# Patient Record
Sex: FEMALE | Race: WHITE | NOT HISPANIC OR LATINO | Employment: PART TIME | URBAN - METROPOLITAN AREA
[De-identification: names, ages, dates, MRNs, and addresses within clinical notes are randomized per-mention and may not be internally consistent; named-entity substitution may affect disease eponyms.]

---

## 2021-05-24 ENCOUNTER — OFFICE VISIT (OUTPATIENT)
Dept: URGENT CARE | Age: 71
End: 2021-05-24
Payer: COMMERCIAL

## 2021-05-24 VITALS
DIASTOLIC BLOOD PRESSURE: 73 MMHG | SYSTOLIC BLOOD PRESSURE: 156 MMHG | TEMPERATURE: 97.4 F | HEART RATE: 90 BPM | OXYGEN SATURATION: 97 % | RESPIRATION RATE: 16 BRPM

## 2021-05-24 DIAGNOSIS — H00.036 EYELID CELLULITIS, LEFT: Primary | ICD-10-CM

## 2021-05-24 PROCEDURE — G0382 LEV 3 HOSP TYPE B ED VISIT: HCPCS | Performed by: PHYSICIAN ASSISTANT

## 2021-05-24 RX ORDER — LISINOPRIL AND HYDROCHLOROTHIAZIDE 25; 20 MG/1; MG/1
1 TABLET ORAL DAILY
COMMUNITY

## 2021-05-24 RX ORDER — LEVOTHYROXINE SODIUM 0.12 MG/1
125 TABLET ORAL DAILY
COMMUNITY

## 2021-05-24 RX ORDER — CEPHALEXIN 500 MG/1
500 CAPSULE ORAL EVERY 6 HOURS SCHEDULED
Qty: 20 CAPSULE | Refills: 0 | Status: SHIPPED | OUTPATIENT
Start: 2021-05-24 | End: 2021-05-29

## 2021-05-25 NOTE — PROGRESS NOTES
[facility]        NAME: So Abdalla is a 69 y o  female  : 1950    MRN: [id number]  DATE: May 24, 2021  TIME: 10:35 PM    Assessment and Plan   Eyelid cellulitis, left [H00 [id number]]  1  Eyelid cellulitis, left  cephalexin (KEFLEX) 500 mg capsule         Patient Instructions       Follow up with PCP in 3-5 days  Proceed to  ER if symptoms worsen  Chief Complaint     Chief Complaint   Patient presents with    Facial Swelling     around left eye; thought it was from working outside yesterday; took benadryl and used hydrocortisone cream         History of Present Illness        Patient states she was outside yesterday working in the [place] and her left eye started getting itchy and swollen  She has been taking Benadryl but hydrocortisone cream on it  It is now red warm and swollen and painful  She denies any drainage  Review of Systems   Review of Systems   Constitutional: Negative  HENT: Negative  Eyes: Positive for pain, redness and itching  Negative for photophobia, discharge and visual disturbance  Respiratory: Negative  Cardiovascular: Negative  Gastrointestinal: Negative  Musculoskeletal: Negative  Neurological: Negative  Psychiatric/Behavioral: Negative            Current Medications       Current Outpatient Medications:     ATORVASTATIN CALCIUM PO, Take by mouth, Disp: , Rfl:     levothyroxine 125 mcg tablet, Take 125 mcg by mouth daily, Disp: , Rfl:     lisinopril-hydrochlorothiazide (PRINZIDE,ZESTORETIC) 20-25 MG per tablet, Take 1 tablet by mouth daily, Disp: , Rfl:     metFORMIN (GLUCOPHAGE) 1000 MG tablet, Take 1,000 mg by mouth 2 (two) times a day with meals, Disp: , Rfl:     cephalexin (KEFLEX) 500 mg capsule, Take 1 capsule (500 mg total) by mouth every 6 (six) hours for 5 days, Disp: 20 capsule, Rfl: 0    Current Allergies     Allergies as of 2021    (No Known Allergies)            The following portions of the patient's history were reviewed and updated as appropriate: allergies, current medications, past family history, past medical history, past social history, past surgical history and problem list      Past Medical History:   Diagnosis Date    Diabetes mellitus ([address] )     Hypertension     Hypothyroid        Past Surgical History:   Procedure Laterality Date    CHOLECYSTECTOMY         History reviewed  No pertinent family history  Medications have been verified  Objective   /73   Pulse 90   Temp (!) 97 4 °F (36 3 °C)   Resp 16   SpO2 97%        Physical Exam     Physical Exam  Vitals signs and nursing note reviewed  Constitutional:       General: She is not in acute distress  Appearance: Normal appearance  She is not ill-appearing, toxic-appearing or diaphoretic  HENT:      Head: Normocephalic and atraumatic  Eyes:      General:         Right eye: No discharge  Left eye: No discharge  Extraocular Movements: Extraocular movements intact  Pupils: Pupils are equal, round, and reactive to light  Comments:  Swelling and erythema surrounding eye on upper and lower eyelid  Cardiovascular:      Rate and Rhythm: Normal rate  Pulses: Normal pulses  Pulmonary:      Effort: Pulmonary effort is normal    Skin:     General: Skin is warm and dry  Capillary Refill: Capillary refill takes less than 2 seconds  Findings: Erythema present  Neurological:      General: No focal deficit present  Mental Status: She is alert and oriented to person, place, and time     Psychiatric:         Mood and Affect: Mood normal          Behavior: Behavior normal

## 2021-05-25 NOTE — PATIENT INSTRUCTIONS
Antibiotics as directed   Benadryl as needed  Follow-up with primary care doctor  ER if her symptoms worsen

## 2021-06-08 ENCOUNTER — OFFICE VISIT (OUTPATIENT)
Dept: OBGYN CLINIC | Facility: MEDICAL CENTER | Age: 71
End: 2021-06-08
Payer: COMMERCIAL

## 2021-06-08 VITALS
HEART RATE: 92 BPM | DIASTOLIC BLOOD PRESSURE: 79 MMHG | WEIGHT: 195 LBS | BODY MASS INDEX: 30.61 KG/M2 | HEIGHT: 67 IN | TEMPERATURE: 97.5 F | SYSTOLIC BLOOD PRESSURE: 135 MMHG

## 2021-06-08 DIAGNOSIS — S86.012A STRAIN OF LEFT ACHILLES TENDON, INITIAL ENCOUNTER: Primary | ICD-10-CM

## 2021-06-08 PROCEDURE — 99203 OFFICE O/P NEW LOW 30 MIN: CPT | Performed by: STUDENT IN AN ORGANIZED HEALTH CARE EDUCATION/TRAINING PROGRAM

## 2021-06-08 NOTE — PROGRESS NOTES
1  Strain of left Achilles tendon, initial encounter       No orders of the defined types were placed in this encounter  Imaging Studies (I personally reviewed images in PACS and report):    None ordered today    IMPRESSION:  Acute left distal Achilles pain with intact Achilles function on clinical exam today  Will treat as achilles tendinitis  -  Patient reports a precipitating injury but unspecified fall prior to her pain  Date of Injury:   06/07/2021  Follow up interval:  2 days    PLAN:  I have discussed with the patient the pathophysiology of this diagnosis and reviewed how the examination correlates with this diagnosis as per above  Treatment options were discussed at length and after discussing these treatment options, I recommended conservative treatments and given home exercises  I recommended she take NSAIDs/ acetaminophen as needed  I recommended icing 20 minutes on/ off as needed  I offered prescription naproxen NSAIDs, but patient prefers to take OTC NSAIDs at this time  I also recommended for her to purchase a heel lift/cup to reduce strain over her Achilles  I counseled this pain should improve over the next 2-6 weeks and if it does not she can follow up if needed  Patient prefers to follow-up as needed at this time    Return if symptoms worsen or fail to improve  CHIEF COMPLAINT:  Left posterior heel pain    HPI:  Marquita Alarcon is a 69 y o  female  who presents for       Visit 06/08/2021 :  Initial evaluation of posterior heel pain: Pt [place] airport -  She is unsure of mechanism of injury but felt sudden pain over the posterior aspect of her heel and denies any pain over her lateral/medial aspects of her ankle  Pain is located over the distal aspect of her Achilles tendon just proximal to it's insertion on the calcaneus  Pain described as sharp / aching / pulling, nonradiating   Pain is aggravated with prolonged walking especially with foot dorsiflexion  She has changed her gait in order to avoid by keeping her foot in dorsiflexion  Walking exacerbates the pain, rest relieves it  No pain at rest and only moderate pain when walking  Denies prior injuries/surgeries of her left ankle / foot  She has not taken any pain medications for this issue as it is not severe  Review of Systems   Constitutional: Negative for chills, fatigue and fever  Respiratory: Negative for cough and shortness of breath  Gastrointestinal: Negative for abdominal pain, nausea and vomiting  Musculoskeletal:        As per HPI   Skin: Negative for rash and wound  Neurological:        As per HPI         Medical, Surgical, Family, and Social History    Past Medical History:   Diagnosis Date    Diabetes mellitus ([facility] [address] )     Hypertension     Hypothyroid      Past Surgical History:   Procedure Laterality Date    CHOLECYSTECTOMY       Social History   Social History     Substance and Sexual Activity   Alcohol Use Yes    Comment: occ  Social History     Substance and Sexual Activity   Drug Use Never     Social History     Tobacco Use   Smoking Status Former Smoker   Smokeless Tobacco Never Used     Family History   Problem Relation Age of Onset    Lymphoma Sister     Cancer Sister      No Known Allergies       Physical Exam  /79   Pulse 92   Temp 97 5 °F (36 4 °C)   Ht 5' 6 5" (1 689 m)   Wt 88 5 kg (195 lb)   BMI 31 00 kg/m²     Constitutional:  see vital signs  Gen: well-developed, normocephalic/atraumatic, well-groomed  Eyes: No inflammation or discharge of conjunctiva or lids; sclera clear   Pharynx: no inflammation, lesion, or mass of lips  Neck: supple, no masses, non-distended  MSK: no inflammation, lesion, mass, or clubbing of nails and digits except for other than mentioned below  SKIN: no visible rashes or skin lesions  Pulmonary/Chest: Effort normal  No respiratory distress     NEURO: cranial nerves grossly intact  PSYCH:  Alert and oriented to person, place, and time; recent and remote memory intact; mood normal, no depression, anxiety, or agitation, judgment and insight good and intact     Ortho Exam  Left Achilles Examination:   Inspection: No swelling, erythema, ecchymosis  Simmonds Triad:  Palpable Gap or Defect of Achilles: none  Angle of Declination: angle of baseline plantarflexion symmetric to contralateral side  Matles Test (patient prone, intact and symmetric plantarflexion of ankle when flexing knee): intact  Sharp's Calf Squeeze Test: intact obligatory plantarflexion    Strength:   Foot dorsiflexion/plantarflexion - 5/5  Ankle eversion/supination - 5/5    ROM:   Plantarflexion: 50  Dorsiflexion: 20 (aggravates distal achilles pain)    Gait: normal w/o antalgia